# Patient Record
Sex: FEMALE | Race: WHITE | NOT HISPANIC OR LATINO | Employment: STUDENT | URBAN - NONMETROPOLITAN AREA
[De-identification: names, ages, dates, MRNs, and addresses within clinical notes are randomized per-mention and may not be internally consistent; named-entity substitution may affect disease eponyms.]

---
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
## 2021-03-06 ENCOUNTER — HOSPITAL ENCOUNTER (EMERGENCY)
Facility: HOSPITAL | Age: 21
Discharge: HOME | End: 2021-03-06
Attending: EMERGENCY MEDICINE | Admitting: EMERGENCY MEDICINE

## 2021-03-06 ENCOUNTER — APPOINTMENT (OUTPATIENT)
Dept: GENERAL RADIOLOGY | Facility: HOSPITAL | Age: 21
End: 2021-03-06

## 2021-03-06 VITALS
DIASTOLIC BLOOD PRESSURE: 71 MMHG | HEART RATE: 98 BPM | WEIGHT: 180 LBS | OXYGEN SATURATION: 97 % | BODY MASS INDEX: 24.38 KG/M2 | TEMPERATURE: 99 F | RESPIRATION RATE: 16 BRPM | SYSTOLIC BLOOD PRESSURE: 112 MMHG | HEIGHT: 72 IN

## 2021-03-06 DIAGNOSIS — S93.402A SPRAIN OF LEFT ANKLE, UNSPECIFIED LIGAMENT, INITIAL ENCOUNTER: Primary | ICD-10-CM

## 2021-03-06 PROCEDURE — 73610 X-RAY EXAM OF ANKLE: CPT

## 2021-03-06 PROCEDURE — 99283 EMERGENCY DEPT VISIT LOW MDM: CPT

## 2021-03-06 RX ORDER — IBUPROFEN 800 MG/1
800 TABLET ORAL ONCE
Status: COMPLETED | OUTPATIENT
Start: 2021-03-06 | End: 2021-03-06

## 2021-03-06 RX ADMIN — IBUPROFEN 800 MG: 800 TABLET ORAL at 10:25

## 2021-03-06 NOTE — ED PROVIDER NOTES
Subjective   20-year-old female presents to the ED with chief complaint of left ankle injury.  Patient is complaining of pain on the lateral aspect of her left ankle.  Dull ache.  Pain is constant since onset.  Onset was just prior to arrival when she was stepping off of a box jump and rolled her ankle outward.  Pain is a 6 out of 10.  Complains of swelling and ecchymosis.  She denies other injuries or complaints.  No prior treatments relieving factors.          Review of Systems   Musculoskeletal: Positive for arthralgias.        Left ankle injury   All other systems reviewed and are negative.      Past Medical History:   Diagnosis Date   • Exercise-induced asthma        No Known Allergies    History reviewed. No pertinent surgical history.    Family History   Problem Relation Age of Onset   • Lung cancer Father         stage 4       Social History     Socioeconomic History   • Marital status: Unknown     Spouse name: Not on file   • Number of children: Not on file   • Years of education: Not on file   • Highest education level: Not on file   Tobacco Use   • Smoking status: Never Smoker   • Smokeless tobacco: Never Used   Vaping Use   • Vaping Use: Never used   Substance and Sexual Activity   • Alcohol use: No   • Drug use: No   • Sexual activity: Defer           Objective   Physical Exam  Vitals and nursing note reviewed.   Constitutional:       General: She is not in acute distress.     Appearance: She is well-developed. She is not diaphoretic.   HENT:      Head: Normocephalic and atraumatic.      Nose: Nose normal.   Eyes:      Conjunctiva/sclera: Conjunctivae normal.   Cardiovascular:      Rate and Rhythm: Normal rate and regular rhythm.   Pulmonary:      Effort: Pulmonary effort is normal. No respiratory distress.      Breath sounds: Normal breath sounds.   Abdominal:      General: There is no distension.      Palpations: Abdomen is soft.      Tenderness: There is no abdominal tenderness. There is no guarding.    Musculoskeletal:         General: No deformity.      Left ankle: Swelling and ecchymosis present. Tenderness present over the lateral malleolus.   Neurological:      Mental Status: She is alert and oriented to person, place, and time.      Cranial Nerves: No cranial nerve deficit.         Procedures           ED Course                                           MDM  Imaging negative for acute process.  Exam and history consistent with a sprain.  Appropriate for discharge follow-up as needed.      Final diagnoses:   Sprain of left ankle, unspecified ligament, initial encounter            Connor Gerard, DO  03/06/21 1523

## 2021-10-15 ENCOUNTER — TRANSCRIBE ORDERS (OUTPATIENT)
Dept: ADMINISTRATIVE | Facility: HOSPITAL | Age: 21
End: 2021-10-15

## 2021-10-15 DIAGNOSIS — R53.83 OTHER FATIGUE: Primary | ICD-10-CM

## 2021-10-15 DIAGNOSIS — M25.511 RIGHT SHOULDER PAIN, UNSPECIFIED CHRONICITY: ICD-10-CM

## 2021-11-09 ENCOUNTER — HOSPITAL ENCOUNTER (OUTPATIENT)
Dept: MRI IMAGING | Facility: HOSPITAL | Age: 21
Discharge: HOME | End: 2021-11-09
Admitting: NURSE PRACTITIONER

## 2021-11-09 DIAGNOSIS — R53.83 OTHER FATIGUE: ICD-10-CM

## 2021-11-09 DIAGNOSIS — M25.511 RIGHT SHOULDER PAIN, UNSPECIFIED CHRONICITY: ICD-10-CM

## 2021-11-09 PROCEDURE — 73221 MRI JOINT UPR EXTREM W/O DYE: CPT

## 2024-01-02 DIAGNOSIS — M25.511 ARTHRALGIA OF RIGHT SHOULDER REGION: Primary | ICD-10-CM

## 2024-01-03 ENCOUNTER — OFFICE VISIT (OUTPATIENT)
Dept: ORTHOPEDIC SURGERY | Facility: CLINIC | Age: 24
End: 2024-01-03
Payer: MEDICAID

## 2024-01-03 VITALS — HEIGHT: 72 IN | TEMPERATURE: 97.6 F | BODY MASS INDEX: 24.92 KG/M2 | WEIGHT: 184 LBS

## 2024-01-03 DIAGNOSIS — G56.21 ULNAR NEUROPATHY OF RIGHT UPPER EXTREMITY: ICD-10-CM

## 2024-01-03 DIAGNOSIS — M75.41 IMPINGEMENT SYNDROME OF RIGHT SHOULDER: Primary | ICD-10-CM

## 2024-01-03 DIAGNOSIS — M25.511 ARTHRALGIA OF RIGHT SHOULDER REGION: ICD-10-CM

## 2024-01-03 PROCEDURE — 73030 X-RAY EXAM OF SHOULDER: CPT | Performed by: STUDENT IN AN ORGANIZED HEALTH CARE EDUCATION/TRAINING PROGRAM

## 2024-01-03 PROCEDURE — 20610 DRAIN/INJ JOINT/BURSA W/O US: CPT | Performed by: STUDENT IN AN ORGANIZED HEALTH CARE EDUCATION/TRAINING PROGRAM

## 2024-01-03 PROCEDURE — 72040 X-RAY EXAM NECK SPINE 2-3 VW: CPT | Performed by: STUDENT IN AN ORGANIZED HEALTH CARE EDUCATION/TRAINING PROGRAM

## 2024-01-03 PROCEDURE — 99204 OFFICE O/P NEW MOD 45 MIN: CPT | Performed by: STUDENT IN AN ORGANIZED HEALTH CARE EDUCATION/TRAINING PROGRAM

## 2024-01-03 RX ORDER — SERTRALINE HYDROCHLORIDE 100 MG/1
TABLET, FILM COATED ORAL
COMMUNITY
Start: 2022-01-28

## 2024-01-03 RX ORDER — BUSPIRONE HYDROCHLORIDE 5 MG/1
1 TABLET ORAL EVERY 12 HOURS SCHEDULED
COMMUNITY
Start: 2023-09-26

## 2024-01-03 RX ADMIN — LIDOCAINE HYDROCHLORIDE 2 ML: 20 INJECTION, SOLUTION INFILTRATION; PERINEURAL at 13:19

## 2024-01-03 RX ADMIN — METHYLPREDNISOLONE ACETATE 40 MG: 40 INJECTION, SUSPENSION INTRA-ARTICULAR; INTRALESIONAL; INTRAMUSCULAR; SOFT TISSUE at 13:19

## 2024-01-03 NOTE — PROGRESS NOTES
Office Note     Name: Jeanine Ely    : 2000     MRN: [id number]     Chief Complaint  Injury of the Right Shoulder (States she started having pain about two years ago, no known injury but she has played softball since she was about 5. States the pain was only when throwing the ball during softball but has recently started hurting with everyday activities. She does have some numbness and tingling in her pinky and ring finger at times.)    Subjective     History of Present Illness:  Jeanine Ely is a 23 y.o. female presenting today for chronic right shoulder pain ongoing for the last 2 years with no known injury.  Patient states that recently the pain and symptoms have been worsening and she is now having difficulty raising her arm above her shoulder.  She also notes some weakness with the arm mostly due to pain.  She denies entire right upper extremity numbness and tingling however does admit to numbness and tingling in the right fifth and fourth volar digits.  She denies any significant weakness in her right hand although states that she will frequently waking up with her right hand numb.  She denies any previous significant injuries to the shoulder or elbow or hand.  Patient does not currently do sports although she states that she has played softball since she was 5 years old.  She denies chronic or acute neck pain.    Review of Systems   Constitutional:  Negative for fever.   HENT:  Negative for dental problem and voice change.    Eyes:  Negative for visual disturbance.   Respiratory:  Negative for shortness of breath.    Cardiovascular:  Negative for chest pain.   Gastrointestinal:  Negative for abdominal pain.   Genitourinary:  Negative for dysuria.   Musculoskeletal:  Positive for arthralgias (right shoulder). Negative for gait problem and joint swelling.   Skin:  Negative for rash.   Neurological:  Positive for numbness (fingers on right hand). Negative for speech difficulty.  "  Hematological:  Does not bruise/bleed easily.   Psychiatric/Behavioral:  Negative for confusion.         Past Medical History:   Diagnosis Date   • Exercise-induced asthma         No past surgical history on file.    Family History   Problem Relation Age of Onset   • Lung cancer Father         stage 4       Social History     Socioeconomic History   • Marital status: Unknown   Tobacco Use   • Smoking status: Never   • Smokeless tobacco: Never   Vaping Use   • Vaping Use: Never used   Substance and Sexual Activity   • Alcohol use: No   • Drug use: No   • Sexual activity: Defer         Current Outpatient Medications:   •  busPIRone (BUSPAR) 5 MG tablet, Take 1 tablet by mouth Every 12 (Twelve) Hours., Disp: , Rfl:   •  sertraline (ZOLOFT) 100 MG tablet, , Disp: , Rfl:     No Known Allergies        Objective   Temp 97.6 °F (36.4 °C)   Ht 185.4 cm (72.99\")   Wt 83.5 kg (184 lb)   LMP 12/11/2023 (Exact Date)   BMI 24.28 kg/m²    BMI is within normal parameters. No other follow-up for BMI required.       Physical Exam  Right Hand Exam     Tenderness   The patient is experiencing no tenderness.     Range of Motion   The patient has normal right wrist ROM.     Muscle Strength   The patient has normal right wrist strength.    Tests   Phalen’s Sign: negative  Tinel's sign (median nerve): negative    Other   Erythema: absent  Sensation: normal  Pulse: present    Comments:  Negative Tinel's at the cubital tunnel and Guyon's canal.  No hypothenar atrophy is noted.  Carpal pulses 2+, cap refill brisk to each digit, gross sensation intact to light touch.      Right Shoulder Exam     Range of Motion   Active abduction:  140   Extension:  40   External rotation:  90   Forward flexion:  140   Internal rotation 0 degrees:  L3   Internal rotation 90 degrees:  normal     Muscle Strength   The patient has normal right shoulder strength.    Tests   Montalvo test: positive  Cross arm: negative  Impingement: positive  Drop arm: " negative    Other   Erythema: absent  Sensation: normal  Pulse: present      Left Shoulder Exam     Range of Motion   The patient has normal left shoulder ROM.          Extremity DVT signs are negative by clinical screen.     Independent Review of Radiographic Studies:    2 view plain films of the C-spine were done in office today for the evaluation of paresthesias, no comparison views available.  There were no acute osseous abnormalities noted.  No significant degenerative changes are noted.    Three-view plain films of the right shoulder were done in office today for the evaluation of pain, no comparison views available.  No acute osseous abnormalities are noted, no significant degenerative disease is noted.  MRI report from 2021 was unremarkable.    Large Joint Arthrocentesis: R subacromial bursa  Date/Time: 1/3/2024 1:19 PM  Consent given by: patient  Site marked: site marked  Timeout: Immediately prior to procedure a time out was called to verify the correct patient, procedure, equipment, support staff and site/side marked as required   Supporting Documentation  Indications: pain   Procedure Details  Location: shoulder - R subacromial bursa  Preparation: Patient was prepped and draped in the usual sterile fashion  Needle size: 22 G  Approach: anterolateral  Medications administered: 40 mg methylPREDNISolone acetate 40 MG/ML; 2 mL lidocaine 2%  Patient tolerance: patient tolerated the procedure well with no immediate complications        Assessment and Plan   Diagnoses and all orders for this visit:    1. Impingement syndrome of right shoulder (Primary)  -     Ambulatory Referral to Physical Therapy Ortho    2. Arthralgia of right shoulder region  -     XR Spine Cervical 2 or 3 View; Future  -     Ambulatory Referral to Physical Therapy Ortho    3. Ulnar neuropathy of right upper extremity  -     EMG & Nerve Conduction Test       Discussion of orthopedic goals  Orthopedic activities reviewed and patient  expressed appreciation  Regular exercise as tolerated  Risk, benefits, and merits of treatment alternatives reviewed with the patient and questions answered  Patient guided on mobility and conditioning exercises  Ice, heat, and/or modalities as beneficial  Call or notify for any adverse effect from injection therapy  Physical therapy referral given  Reduced physical activity as appropriate and avoid offending activity    Recommendations/Plan:  Exercise, medications, injections, other patient advice, and return appointment as noted.  Referral: Physical and Occupational Therapy referral.  Patient is encouraged to call or return for any issues or concerns.    Differential diagnosis, subacromial bursitis/impingement syndrome, labral tear, partial rotator cuff tear, ulnar neuropathy at the elbow, cervical radiculopathy.    EMG/nerve conduction study ordered of both upper extremities for the evaluation of cubital tunnel and/or ulnar neuropathy of the right hand.  Patient was given a cortisone injection to the right subacromial space for symptomatic relief.  She was also given a course of physical therapy to improve right shoulder strength pain and range of motion.  Also advised the use of over-the-counter analgesics as well as muscle creams such as diclofenac or Biofreeze as needed for symptomatic relief.  I discussed the most likely diagnosis which I believed to be subacromial bursitis impingement syndrome as well as ulnar neuropathy at the elbow although advised that patient may also have pathology such as a labral tear, partial rotator cuff tear and/or cervical radiculopathy.  Advised that we will try these conservative treatment options and follow-up in 2 months.  If patient continues to have significant pain consider repeat MRI of the right shoulder, diagnostic arthroscopy of the right shoulder, and/or MRI of the C-spine to evaluate for cervical radiculopathy.  Patient is agreeable with plan I advised her to call or  return office for any concerns in the interim.    Return in about 2 months (around 3/3/2024) for Recheck.  Patient agreeable to call or return sooner for any concerns.

## 2024-01-08 ENCOUNTER — PATIENT ROUNDING (BHMG ONLY) (OUTPATIENT)
Dept: ORTHOPEDIC SURGERY | Facility: CLINIC | Age: 24
End: 2024-01-08
Payer: MEDICAID

## 2024-01-08 RX ORDER — LIDOCAINE HYDROCHLORIDE 20 MG/ML
2 INJECTION, SOLUTION INFILTRATION; PERINEURAL
Status: COMPLETED | OUTPATIENT
Start: 2024-01-03 | End: 2024-01-03

## 2024-01-08 RX ORDER — METHYLPREDNISOLONE ACETATE 40 MG/ML
40 INJECTION, SUSPENSION INTRA-ARTICULAR; INTRALESIONAL; INTRAMUSCULAR; SOFT TISSUE
Status: COMPLETED | OUTPATIENT
Start: 2024-01-03 | End: 2024-01-03

## 2024-01-08 NOTE — PROGRESS NOTES
A [employer] message has been sent to the patient for [place] [facility]